# Patient Record
(demographics unavailable — no encounter records)

---

## 2025-05-29 NOTE — PHYSICAL EXAM
[General Appearance - Well Developed] : well developed [Normal Appearance] : normal appearance [Well Groomed] : well groomed [General Appearance - Well Nourished] : well nourished [No Deformities] : no deformities [General Appearance - In No Acute Distress] : no acute distress [Normal Conjunctiva] : the conjunctiva exhibited no abnormalities [Eyelids - No Xanthelasma] : the eyelids demonstrated no xanthelasmas [Normal Oral Mucosa] : normal oral mucosa [No Oral Pallor] : no oral pallor [No Oral Cyanosis] : no oral cyanosis [Normal Jugular Venous A Waves Present] : normal jugular venous A waves present [Normal Jugular Venous V Waves Present] : normal jugular venous V waves present [No Jugular Venous Vega A Waves] : no jugular venous vega A waves [Respiration, Rhythm And Depth] : normal respiratory rhythm and effort [Exaggerated Use Of Accessory Muscles For Inspiration] : no accessory muscle use [Auscultation Breath Sounds / Voice Sounds] : lungs were clear to auscultation bilaterally [Heart Rate And Rhythm] : heart rate and rhythm were normal [Heart Sounds] : normal S1 and S2 [Murmurs] : no murmurs present [Arterial Pulses Normal] : the arterial pulses were normal [Edema] : no peripheral edema present [Abdomen Soft] : soft [Abdomen Tenderness] : non-tender [Abdomen Mass (___ Cm)] : no abdominal mass palpated [Abnormal Walk] : normal gait [Gait - Sufficient For Exercise Testing] : the gait was sufficient for exercise testing [Nail Clubbing] : no clubbing of the fingernails [Cyanosis, Localized] : no localized cyanosis [] : no ischemic changes [Petechial Hemorrhages (___cm)] : no petechial hemorrhages [Oriented To Time, Place, And Person] : oriented to person, place, and time [Affect] : the affect was normal [Mood] : the mood was normal [No Anxiety] : not feeling anxious

## 2025-05-29 NOTE — REASON FOR VISIT
[FreeTextEntry1] : 79 year-old female with HTN, DM, HLD, CKD, aortic aneurysm (4.2 cm), presents for followup.    Patient was last seen on 3/19/24 -> Patient needs cardiac clearance prior to AV-fistula creation for HD. Patient has stable aortic aneurysm and AR. Patient denies CP, SOB, palpitations, or lightheadedness. She is on ASA 81 mg for cardioprotection. She is on Atorvastatin 20 mg for HLD. She is on Metoprolol ER 50 mg and Olmesartan 40 mg for HTN. /85 HR 86. Exam unremarkable. ECG showed no ischemic changes. Patient underwent an echocardiogram and it showed normal LV function with moderate AR. Patient is cleared for surgery and anesthesia. She may hold ASA 81 mg for 7 days prior to surgery.  She is on ASA 81 mg for cardioprotection.  She is on Atorvastatin 20 mg for HLD.  She is on Metoprolol ER 50 mg and Olmesartan 40 mg for HTN.    Patient underwent an echocardiogram 10/30/19 and it showed normal LV function, mild aortic root dilatation (4.2 cm), with mild-mod AR.    Patient underwent a treadmill stress test 10/30/19 and completed 2 minutes of Rob protocol.  There was no ECG evidence of ischemia.  Following treadmill stress, there was no echocardiographic evidence of ischemia.  However, patient was unable to reach target HR.   Patient underwent an echocardiogram 5/16/17 and it showed normal LV function without significant valvular pathology.   Patient underwent a treadmill stress test 5/16/17 and completed 5 minutes of Rob protocol. There were upsloping ST depressions on ECG but no symptoms.  Patient was noted to have mild aortic root dilatation (4.0 cm).

## 2025-05-29 NOTE — HISTORY OF PRESENT ILLNESS
[FreeTextEntry1] : 5/29/25 -   (1) Aortic aneurysm, AR -  I advised patient to undergo an echocardiogram.   (2) HTN - Her BP was borderline.  I advised patient to continue Metoprolol ER 50 mg and Olmesartan 40 mg.  I will consider Amlodipine 2.5 mg if her BP remains elevated.  (3) Followup - 1 year.  3/19/24 - Patient needs cardiac clearance prior to AV-fistula creation for HD.  Patient has stable aortic aneurysm and AR.  Patient denies CP, SOB, palpitations, or lightheadedness.  She is on ASA 81 mg for cardioprotection.  She is on Atorvastatin 20 mg for HLD.  She is on Metoprolol ER 50 mg and Olmesartan 40 mg for HTN.  /85 HR 86.  Exam unremarkable.  ECG showed no ischemic changes.  Patient underwent an echocardiogram and it showed normal LV function with moderate AR.  Patient is cleared for surgery and anesthesia.  She may hold ASA 81 mg for 7 days prior to surgery.  (1) Cardiac clearance prior to AV-fistula creation - Patient has stable aortic aneurysm and AR.  Patient denies exertional symptoms.  ECG showed no ischemic changes.  Patient underwent an echocardiogram and it showed normal LV function with moderate AR.  Patient is cleared for surgery and anesthesia.  She may hold ASA 81 mg for 7 days prior to surgery.  (2) Aortic aneurysm, AR - Patient underwent an echocardiogram and it showed normal LV function, mild aortic root dilatation (4.1 cm), mild dilatation of the ascending aorta (3.9 cm), with moderate AR.  I advised patient to keep BP under good control.  Patient should have a followup echo in 1 year.   (3) HTN - Her BP was borderline.  I advised patient to continue Metoprolol ER 50 mg and Olmesartan 40 mg.  (4) Followup - 1 year.   10/30/19 - Patient reports left-sided CP for the past 2 weeks, described as sharp pain, lasting 3-5 minutes, not related to exertion or the meals.  Patient reports belching.  Patient had CXR 6 months ago which was unremarkable.  I advised patient to undergo an echocardiogram and a treadmill stress test.  Patient wished to return for the tests.   Patient underwent an echocardiogram and it showed normal LV function, mild aortic root dilatation (4.2 cm), with mild-mod AR.  Patient underwent a treadmill stress test and completed 2 minutes of Rob protocol.  There was no ECG evidence of ischemia.  Following treadmill stress, there was no echocardiographic evidence of ischemia.  However, patient was unable to reach target HR.